# Patient Record
Sex: MALE | Race: WHITE | NOT HISPANIC OR LATINO | Employment: FULL TIME
[De-identification: names, ages, dates, MRNs, and addresses within clinical notes are randomized per-mention and may not be internally consistent; named-entity substitution may affect disease eponyms.]

---

## 2023-04-07 ENCOUNTER — TRANSCRIBE ORDERS (OUTPATIENT)
Dept: OTHER | Age: 53
End: 2023-04-07

## 2023-04-07 DIAGNOSIS — M24.311: Primary | ICD-10-CM

## 2023-04-07 DIAGNOSIS — S46.011A STRAIN OF RIGHT ROTATOR CUFF CAPSULE, INITIAL ENCOUNTER: ICD-10-CM

## 2023-04-07 DIAGNOSIS — S42.291A HILL-SACHS LESION OF RIGHT SHOULDER: ICD-10-CM

## 2023-04-18 ENCOUNTER — THERAPY VISIT (OUTPATIENT)
Dept: PHYSICAL THERAPY | Facility: CLINIC | Age: 53
End: 2023-04-18
Attending: ORTHOPAEDIC SURGERY
Payer: OTHER MISCELLANEOUS

## 2023-04-18 DIAGNOSIS — M25.311 SHOULDER INSTABILITY, RIGHT: Primary | ICD-10-CM

## 2023-04-18 PROCEDURE — 97110 THERAPEUTIC EXERCISES: CPT | Mod: GP | Performed by: PHYSICAL THERAPIST

## 2023-04-18 PROCEDURE — 97161 PT EVAL LOW COMPLEX 20 MIN: CPT | Mod: GP | Performed by: PHYSICAL THERAPIST

## 2023-04-21 ENCOUNTER — THERAPY VISIT (OUTPATIENT)
Dept: PHYSICAL THERAPY | Facility: CLINIC | Age: 53
End: 2023-04-21
Payer: OTHER MISCELLANEOUS

## 2023-04-21 DIAGNOSIS — M25.311 SHOULDER INSTABILITY, RIGHT: Primary | ICD-10-CM

## 2023-04-21 PROCEDURE — 97110 THERAPEUTIC EXERCISES: CPT | Mod: GP | Performed by: PHYSICAL THERAPIST

## 2023-04-28 ENCOUNTER — THERAPY VISIT (OUTPATIENT)
Dept: PHYSICAL THERAPY | Facility: CLINIC | Age: 53
End: 2023-04-28
Payer: OTHER MISCELLANEOUS

## 2023-04-28 DIAGNOSIS — M25.311 SHOULDER INSTABILITY, RIGHT: Primary | ICD-10-CM

## 2023-04-28 PROCEDURE — 97110 THERAPEUTIC EXERCISES: CPT | Mod: GP | Performed by: PHYSICAL THERAPIST

## 2023-05-02 ENCOUNTER — THERAPY VISIT (OUTPATIENT)
Dept: PHYSICAL THERAPY | Facility: CLINIC | Age: 53
End: 2023-05-02
Payer: OTHER MISCELLANEOUS

## 2023-05-02 DIAGNOSIS — M25.511 CHRONIC RIGHT SHOULDER PAIN: Primary | ICD-10-CM

## 2023-05-02 DIAGNOSIS — G89.29 CHRONIC RIGHT SHOULDER PAIN: Primary | ICD-10-CM

## 2023-05-02 PROCEDURE — 97110 THERAPEUTIC EXERCISES: CPT | Mod: GP | Performed by: PHYSICAL THERAPIST

## 2023-05-04 ENCOUNTER — THERAPY VISIT (OUTPATIENT)
Dept: PHYSICAL THERAPY | Facility: CLINIC | Age: 53
End: 2023-05-04
Payer: OTHER MISCELLANEOUS

## 2023-05-04 DIAGNOSIS — G89.29 CHRONIC RIGHT SHOULDER PAIN: Primary | ICD-10-CM

## 2023-05-04 DIAGNOSIS — M25.511 CHRONIC RIGHT SHOULDER PAIN: Primary | ICD-10-CM

## 2023-05-04 PROCEDURE — 97110 THERAPEUTIC EXERCISES: CPT | Mod: GP | Performed by: PHYSICAL THERAPIST

## 2023-05-19 ENCOUNTER — THERAPY VISIT (OUTPATIENT)
Dept: PHYSICAL THERAPY | Facility: CLINIC | Age: 53
End: 2023-05-19
Payer: OTHER MISCELLANEOUS

## 2023-05-19 DIAGNOSIS — M25.511 CHRONIC RIGHT SHOULDER PAIN: Primary | ICD-10-CM

## 2023-05-19 DIAGNOSIS — G89.29 CHRONIC RIGHT SHOULDER PAIN: Primary | ICD-10-CM

## 2023-05-19 PROCEDURE — 97110 THERAPEUTIC EXERCISES: CPT | Mod: GP | Performed by: PHYSICAL THERAPIST

## 2023-05-24 ENCOUNTER — THERAPY VISIT (OUTPATIENT)
Dept: PHYSICAL THERAPY | Facility: CLINIC | Age: 53
End: 2023-05-24
Attending: ORTHOPAEDIC SURGERY
Payer: OTHER MISCELLANEOUS

## 2023-05-24 DIAGNOSIS — M25.511 CHRONIC RIGHT SHOULDER PAIN: Primary | ICD-10-CM

## 2023-05-24 DIAGNOSIS — G89.29 CHRONIC RIGHT SHOULDER PAIN: Primary | ICD-10-CM

## 2023-05-24 PROCEDURE — 97110 THERAPEUTIC EXERCISES: CPT | Mod: GP | Performed by: PHYSICAL THERAPIST

## 2023-06-01 ENCOUNTER — THERAPY VISIT (OUTPATIENT)
Dept: PHYSICAL THERAPY | Facility: CLINIC | Age: 53
End: 2023-06-01
Payer: OTHER MISCELLANEOUS

## 2023-06-01 DIAGNOSIS — M25.511 CHRONIC RIGHT SHOULDER PAIN: Primary | ICD-10-CM

## 2023-06-01 DIAGNOSIS — G89.29 CHRONIC RIGHT SHOULDER PAIN: Primary | ICD-10-CM

## 2023-06-01 PROCEDURE — 97110 THERAPEUTIC EXERCISES: CPT | Mod: GP | Performed by: PHYSICAL THERAPIST

## 2023-06-06 ENCOUNTER — THERAPY VISIT (OUTPATIENT)
Dept: PHYSICAL THERAPY | Facility: CLINIC | Age: 53
End: 2023-06-06
Payer: OTHER MISCELLANEOUS

## 2023-06-06 DIAGNOSIS — G89.29 CHRONIC RIGHT SHOULDER PAIN: Primary | ICD-10-CM

## 2023-06-06 DIAGNOSIS — M25.511 CHRONIC RIGHT SHOULDER PAIN: Primary | ICD-10-CM

## 2023-06-06 PROCEDURE — 97112 NEUROMUSCULAR REEDUCATION: CPT | Mod: GP | Performed by: PHYSICAL THERAPIST

## 2023-06-06 PROCEDURE — 97110 THERAPEUTIC EXERCISES: CPT | Mod: GP | Performed by: PHYSICAL THERAPIST

## 2023-06-22 ENCOUNTER — THERAPY VISIT (OUTPATIENT)
Dept: PHYSICAL THERAPY | Facility: CLINIC | Age: 53
End: 2023-06-22
Payer: OTHER MISCELLANEOUS

## 2023-06-22 DIAGNOSIS — M25.511 CHRONIC RIGHT SHOULDER PAIN: Primary | ICD-10-CM

## 2023-06-22 DIAGNOSIS — G89.29 CHRONIC RIGHT SHOULDER PAIN: Primary | ICD-10-CM

## 2023-06-22 DIAGNOSIS — M25.311 SHOULDER INSTABILITY, RIGHT: ICD-10-CM

## 2023-06-22 PROCEDURE — 97110 THERAPEUTIC EXERCISES: CPT | Mod: GP | Performed by: PHYSICAL THERAPIST

## 2023-06-23 NOTE — PROGRESS NOTES
PLAN  Continue therapy per current plan of care.     06/22/23 0500   Appointment Info   Signing clinician's name / credentials Tank Carl PT ATC Cert   Total/Authorized Visits 12   Visits Used 8   Medical Diagnosis R Bankhart Injury   PT Tx Diagnosis R shoudle rpain and instability   Precautions/Limitations End range positions of ER and extension beyond midline   Other pertinent information non surgical   Progress Note/Certification   Onset of illness/injury or Date of Surgery 03/03/23   Therapy Frequency 1x/week   Predicted Duration 12 weeks   Progress Note Due Date 07/06/23       Present No   PT Goal 1   Goal Identifier reaching   Goal Description dressing, showering, personal cares , work   Rationale to maximize safety and independence with performance of ADLs and functional tasks;to maximize safety and independence within the home;to maximize safety and independence with self cares   Goal Progress improving ROM to roughly 90% restored overhead, limited ER and posterior reach   Target Date 07/18/23   Subjective Report   Subjective Report Mahad returns to therapy without sling today.  He has been seen in therapy for a total of 8 visits. He has RTW with restrictions of heavy lifting and repeated movement overhead. Working at [employer] [facility] with lifting restrictions. He is intermittant soreness with R shoulder , but no sx's of subluxation or dislocation. Slowly on the move towards restoreing function. SPADI improved to 15/100. He notes of 80-90% recovered function of day to day life. Allison has 3 additional visits remaining with goal of full self care and DC by the end of July 2023. Concentration to scap stability, proprioception and endurance RTC strengthening.   Objective Measures   Objective Measures Objective Measure 1;Objective Measure 2;Objective Measure 3;Objective Measure 4   Objective Measure 1   Objective Measure Assisted flexion ROM   Details 160-170  degrees near equal to the opposite side. AROM 150   Objective Measure 2   Objective Measure strength.   Details weak and painful with ER and abduction   Objective Measure 3   Objective Measure painful arc and positive Neers/Cox Test   Objective Measure 4   Objective Measure scap dyskinesia noted   Details Flexion and abduction position   Cryotherapy   Ice -Type Pack   Duration 10 min   Location R shoudler   Positioning sitting   Treatment Interventions (PT)   Interventions Therapeutic Procedure/Exercise;Neuromuscular Re-education   Therapeutic Procedure/Exercise   Therapeutic Procedures: strength, endurance, ROM, flexibillity minutes (21436) 40   Therapeutic Procedures Ther Proc 2;Ther Proc 3;Ther Proc 4;Ther Proc 5;Ther Proc 6;Ther Proc 7;Ther Proc 8   Ther Proc 1 UBE   Ther Proc 1 - Details x 3 , 30:30, 30-50 rpm   Ther Proc 2 ball vs wall   Ther Proc 2 - Details x 30 sec each direction   Ther Proc 3 adduction with cable   Ther Proc 3 - Details 3 plates 2 x 15   Ther Proc 4 SL ER   Ther Proc 4 - Details 2 x 15 , towel under arm 1#   Ther Proc 5 standing arm extension , palm forward   Ther Proc 5 - Details 2 x 15 1# bilaterally   Skilled Intervention manual quing, visual quing and tactile strategies to MM fatigue, strength and re-education   Patient Response/Progress fatigue, painfree   Ther Proc 6 scap retrac   Ther Proc 6 - Details 3 plates x 30   Ther Proc 7 manual pertebation in supine   Ther Proc 7 - Details 3 x 20 sec   Education   Learner/Method Patient;Demonstration;Pictures/Video;No Barriers to Learning   Plan   Home program see PTrx   Updates to plan of care advanced stabilization exercise s   Plan for next session weight bearing strengthening such as wall push ups and proprioception.   Comments   Comments weight restriction to 5# floor to waist , 1# to shoulder, nothing above shoulder   Total Session Time   Timed Code Treatment Minutes 40   Total Treatment Time (sum of timed and untimed services) 40            Referring Provider:  Osman Stovall

## 2023-07-05 ENCOUNTER — THERAPY VISIT (OUTPATIENT)
Dept: PHYSICAL THERAPY | Facility: CLINIC | Age: 53
End: 2023-07-05
Payer: OTHER MISCELLANEOUS

## 2023-07-05 DIAGNOSIS — M25.511 CHRONIC RIGHT SHOULDER PAIN: Primary | ICD-10-CM

## 2023-07-05 DIAGNOSIS — G89.29 CHRONIC RIGHT SHOULDER PAIN: Primary | ICD-10-CM

## 2023-07-05 PROCEDURE — 97110 THERAPEUTIC EXERCISES: CPT | Mod: GP | Performed by: PHYSICAL THERAPIST

## 2023-07-10 ENCOUNTER — THERAPY VISIT (OUTPATIENT)
Dept: PHYSICAL THERAPY | Facility: CLINIC | Age: 53
End: 2023-07-10
Payer: OTHER MISCELLANEOUS

## 2023-07-10 DIAGNOSIS — M25.511 CHRONIC RIGHT SHOULDER PAIN: Primary | ICD-10-CM

## 2023-07-10 DIAGNOSIS — G89.29 CHRONIC RIGHT SHOULDER PAIN: Primary | ICD-10-CM

## 2023-07-10 PROCEDURE — 97110 THERAPEUTIC EXERCISES: CPT | Mod: GP | Performed by: PHYSICAL THERAPIST

## 2023-08-03 ENCOUNTER — THERAPY VISIT (OUTPATIENT)
Dept: PHYSICAL THERAPY | Facility: CLINIC | Age: 53
End: 2023-08-03
Payer: OTHER MISCELLANEOUS

## 2023-08-03 DIAGNOSIS — G89.29 CHRONIC RIGHT SHOULDER PAIN: Primary | ICD-10-CM

## 2023-08-03 DIAGNOSIS — M25.511 CHRONIC RIGHT SHOULDER PAIN: Primary | ICD-10-CM

## 2023-08-03 PROCEDURE — 97110 THERAPEUTIC EXERCISES: CPT | Mod: GP | Performed by: PHYSICAL THERAPIST

## 2023-08-08 ENCOUNTER — THERAPY VISIT (OUTPATIENT)
Dept: PHYSICAL THERAPY | Facility: CLINIC | Age: 53
End: 2023-08-08
Payer: OTHER MISCELLANEOUS

## 2023-08-08 DIAGNOSIS — G89.29 CHRONIC RIGHT SHOULDER PAIN: Primary | ICD-10-CM

## 2023-08-08 DIAGNOSIS — M25.511 CHRONIC RIGHT SHOULDER PAIN: Primary | ICD-10-CM

## 2023-08-08 PROCEDURE — 97110 THERAPEUTIC EXERCISES: CPT | Mod: GP | Performed by: PHYSICAL THERAPIST

## 2023-08-16 ENCOUNTER — THERAPY VISIT (OUTPATIENT)
Dept: PHYSICAL THERAPY | Facility: CLINIC | Age: 53
End: 2023-08-16
Payer: OTHER MISCELLANEOUS

## 2023-08-16 DIAGNOSIS — G89.29 CHRONIC RIGHT SHOULDER PAIN: Primary | ICD-10-CM

## 2023-08-16 DIAGNOSIS — M25.511 CHRONIC RIGHT SHOULDER PAIN: Primary | ICD-10-CM

## 2023-08-16 PROCEDURE — 97110 THERAPEUTIC EXERCISES: CPT | Mod: GP | Performed by: PHYSICAL THERAPIST

## 2023-08-30 ENCOUNTER — THERAPY VISIT (OUTPATIENT)
Dept: PHYSICAL THERAPY | Facility: CLINIC | Age: 53
End: 2023-08-30
Payer: OTHER MISCELLANEOUS

## 2023-08-30 DIAGNOSIS — G89.29 CHRONIC RIGHT SHOULDER PAIN: Primary | ICD-10-CM

## 2023-08-30 DIAGNOSIS — M25.511 CHRONIC RIGHT SHOULDER PAIN: Primary | ICD-10-CM

## 2023-08-30 PROCEDURE — 97110 THERAPEUTIC EXERCISES: CPT | Mod: GP | Performed by: PHYSICAL THERAPIST

## 2023-09-26 ENCOUNTER — TRANSCRIBE ORDERS (OUTPATIENT)
Dept: OTHER | Age: 53
End: 2023-09-26

## 2023-09-26 DIAGNOSIS — M24.311: ICD-10-CM

## 2023-09-26 DIAGNOSIS — M75.101 TEAR OF RIGHT SUPRASPINATUS TENDON: ICD-10-CM

## 2023-09-26 DIAGNOSIS — S42.291A HILL-SACHS LESION OF RIGHT SHOULDER: ICD-10-CM

## 2023-10-19 ENCOUNTER — THERAPY VISIT (OUTPATIENT)
Dept: PHYSICAL THERAPY | Facility: CLINIC | Age: 53
End: 2023-10-19
Payer: OTHER MISCELLANEOUS

## 2023-10-19 DIAGNOSIS — M25.511 CHRONIC RIGHT SHOULDER PAIN: Primary | ICD-10-CM

## 2023-10-19 DIAGNOSIS — G89.29 CHRONIC RIGHT SHOULDER PAIN: Primary | ICD-10-CM

## 2023-10-19 PROCEDURE — 97110 THERAPEUTIC EXERCISES: CPT | Mod: GP | Performed by: PHYSICAL THERAPIST

## 2023-10-19 NOTE — PROGRESS NOTES
PLAN  Continue therapy per current plan of care.     10/19/23 0500   Appointment Info   Signing clinician's name / credentials Tank Carl PT ATC Cert   Total/Authorized Visits 16   Visits Used 14   Medical Diagnosis R Bankhart Injury   PT Tx Diagnosis R shoudle rpain and instability   Precautions/Limitations End range positions of ER and extension beyond midline   Other pertinent information non surgical   Progress Note/Certification   Onset of illness/injury or Date of Surgery 03/03/23   Therapy Frequency 1x/week   Predicted Duration 12 weeks   Progress Note Due Date 10/19/23   Progress Note Completed Date 11/18/23       Present No   GOALS   PT Goals 2   PT Goal 1   Goal Identifier reaching   Goal Description dressing, showering, personal cares , work   Rationale to maximize safety and independence with performance of ADLs and functional tasks;to maximize safety and independence within the home;to maximize safety and independence with self cares   Goal Progress improving ROM to roughly 90% restored overhead, limited ER and posterior reach   Target Date 07/18/23   Date Met 08/08/23   PT Goal 2   Goal Identifier lifting   Goal Description restricted lifting. Goal is to improve lifting floor to waist 25#, waist to shoudler 15#, 2-3# overhead.   Rationale to maximize safety and independence with self cares;to maximize safety and independence within the community;to maximize safety and independence within the home;to maximize safety and independence with performance of ADLs and functional tasks   Target Date 12/22/23   Subjective Report   Subjective Report Patient retiurns from MD office reporting roughly 80% recovery since DOI. MD referred him sback for continued insruction of improving strength and managing rehab and global fitness at his newly subscribed membership to [employer].   Objective Measures   Objective Measures Objective Measure 1;Objective Measure 2;Objective Measure  3;Objective Measure 4;Objective Measure 5   Objective Measure 1   Objective Measure AROM flexion   Details 160 bilaterally , ER 75 each side, IR 90 , posterior reach T12 each side.   Objective Measure 2   Objective Measure strength.   Details weak and painless ER, abduction is uncomfortable but  improving   Objective Measure 3   Objective Measure positive Neers/Cox Test   Objective Measure 4   Objective Measure scap control is improved without hike while elevating his shoudler as he had initially.   Details abduction position.   Objective Measure 5   Objective Measure apprehension sign   Details positive   Treatment Interventions (PT)   Interventions Therapeutic Procedure/Exercise;Neuromuscular Re-education   Therapeutic Procedure/Exercise   Therapeutic Procedures: strength, endurance, ROM, flexibillity minutes (11334) 40   Therapeutic Procedures Ther Proc 2;Ther Proc 3;Ther Proc 4;Ther Proc 5;Ther Proc 6;Ther Proc 7;Ther Proc 8   Ther Proc 1 UBE   Ther Proc 1 - Details x 3 , 30:30, 60 rpm   Ther Proc 2 seted ROW   Ther Proc 2 - Details Lat pull down   Ther Proc 3 wall sit   Ther Proc 3 - Details leg press   Ther Proc 4 calf raises   Ther Proc 4 - Details bridging   Ther Proc 5 dead bug   Ther Proc 5 - Details tricep   Ther Proc 6 bicep   Skilled Intervention manual quing, visual quing and tactile strategies to MM fatigue, strength and re-education   Patient Response/Progress fatigue, painfree   Education   Learner/Method Patient;Demonstration;Pictures/Video;No Barriers to Learning   Plan   Home program see PTrx   Updates to plan of care advanced stabilization exercise s   Plan for next session follow up 1 month. SPADI, modify global strength efforts   Comments   Comments strength test lifting next visit   Total Session Time   Timed Code Treatment Minutes 40   Total Treatment Time (sum of timed and untimed services) 40         Referring Provider:  Osman Stovall

## 2023-11-29 ENCOUNTER — THERAPY VISIT (OUTPATIENT)
Dept: PHYSICAL THERAPY | Facility: CLINIC | Age: 53
End: 2023-11-29
Payer: OTHER MISCELLANEOUS

## 2023-11-29 DIAGNOSIS — G89.29 CHRONIC RIGHT SHOULDER PAIN: Primary | ICD-10-CM

## 2023-11-29 DIAGNOSIS — M25.511 CHRONIC RIGHT SHOULDER PAIN: Primary | ICD-10-CM

## 2023-11-29 PROCEDURE — 97110 THERAPEUTIC EXERCISES: CPT | Mod: GP | Performed by: PHYSICAL THERAPIST

## 2023-11-29 NOTE — PROGRESS NOTES
PLAN  Continue therapy per current plan of care.     11/29/23 0500   Appointment Info   Signing clinician's name / credentials Tank Carl PT ATC Cert   Total/Authorized Visits 16   Medical Diagnosis R Bankhart Injury   PT Tx Diagnosis R shoudle rpain and instability   Precautions/Limitations End range positions of ER and extension beyond midline   Other pertinent information non surgical   Progress Note/Certification   Onset of illness/injury or Date of Surgery 03/03/23   Therapy Frequency 1x/week   Predicted Duration 12 weeks   Progress Note Due Date 10/19/23   Progress Note Completed Date 11/18/23       Present No   GOALS   PT Goals 2   PT Goal 1   Goal Identifier reaching   Goal Description dressing, showering, personal cares , work   Rationale to maximize safety and independence with performance of ADLs and functional tasks;to maximize safety and independence within the home;to maximize safety and independence with self cares   Goal Progress improving ROM to roughly 90% restored overhead, limited ER and posterior reach   Target Date 07/18/23   Date Met 08/08/23   PT Goal 2   Goal Identifier lifting   Goal Description restricted lifting. Goal is to improve lifting floor to waist 25#, waist to shoudler 15#, 2-3# overhead.   Rationale to maximize safety and independence with self cares;to maximize safety and independence within the community;to maximize safety and independence within the home;to maximize safety and independence with performance of ADLs and functional tasks   Target Date 12/22/23   Subjective Report   Subjective Report Patient returns to therapy noting that his R shoulder is really painful again and has not retuirned to the MD or weight room for considitioning He cannot sleep on the R and reaching, lifting reproduces pain. No pain at rest. palpation pain at proximal biceps tendon.. crepitous and hurts with movement such as roling his shoudler forward and backward. He avoids  heavy lifting and reach.   Objective Measures   Objective Measures Objective Measure 1;Objective Measure 2;Objective Measure 3;Objective Measure 4;Objective Measure 5;Objective Measure 6   Objective Measure 1   Objective Measure AROM flexion   Details 160 bilaterally , ER 75 each side, IR 90 , posterior reach T12 each side.   Objective Measure 2   Objective Measure strength.   Details weak and painful ER and abduction   Objective Measure 3   Objective Measure positive Neers/Cox Test   Objective Measure 4   Objective Measure scap control is improved without hike while elevating his shoudler as he had initially.   Details abduction position.   Objective Measure 5   Objective Measure apprehension sign   Details negative   Objective Measure 6   Objective Measure Scap dyskinesia   Details lower trap and serratus anterior weakness   Treatment Interventions (PT)   Interventions Therapeutic Procedure/Exercise;Neuromuscular Re-education   Therapeutic Procedure/Exercise   Therapeutic Procedures: strength, endurance, ROM, flexibillity minutes (89000) 38   Therapeutic Procedures Ther Proc 2;Ther Proc 3;Ther Proc 4;Ther Proc 5;Ther Proc 6;Ther Proc 7;Ther Proc 8   Ther Proc 1 supine ceiling punch   Ther Proc 1 - Details x 30-40 reps 5 ounces or less   Ther Proc 2 SUpien Reverse pendulum   Ther Proc 2 - Details 0-5 ounces 30-40 reps all direction , supine   Ther Proc 3 scap retraction   Ther Proc 3 - Details standing 5 sec hold x 20-30   Ther Proc 8 re-evaluation   Ther Proc 9 scap stability and assist test   Skilled Intervention manual quing, visual quing and tactile strategies to MM fatigue, strength and re-education   Patient Response/Progress fatigue, painfree   Ther Proc 9 - Details no effect to painful arc.   Education   Learner/Method Patient;Demonstration;Pictures/Video;No Barriers to Learning   Plan   Home program see PTrx   Updates to plan of care advanced stabilization exercise s   Plan for next session follow up 1  month. SPADI, modify global strength efforts   Comments   Comments strength test lifting next visit, recommend back to therapy for scap stability and global RTC strength   Total Session Time   Timed Code Treatment Minutes 38   Total Treatment Time (sum of timed and untimed services) 38           Referring Provider:  Osman Stovall

## 2023-12-01 ENCOUNTER — TRANSCRIBE ORDERS (OUTPATIENT)
Dept: OTHER | Age: 53
End: 2023-12-01

## 2023-12-01 DIAGNOSIS — M75.101 TEAR OF RIGHT SUPRASPINATUS TENDON: Primary | ICD-10-CM

## 2023-12-01 DIAGNOSIS — S42.291A HILL-SACHS LESION OF RIGHT SHOULDER: ICD-10-CM

## 2023-12-01 DIAGNOSIS — M24.311: ICD-10-CM

## 2024-02-05 ENCOUNTER — THERAPY VISIT (OUTPATIENT)
Dept: PHYSICAL THERAPY | Facility: CLINIC | Age: 54
End: 2024-02-05
Payer: OTHER MISCELLANEOUS

## 2024-02-05 DIAGNOSIS — G89.29 CHRONIC RIGHT SHOULDER PAIN: Primary | ICD-10-CM

## 2024-02-05 DIAGNOSIS — M25.511 CHRONIC RIGHT SHOULDER PAIN: Primary | ICD-10-CM

## 2024-02-05 PROCEDURE — 97110 THERAPEUTIC EXERCISES: CPT | Mod: GP | Performed by: PHYSICAL THERAPIST

## 2024-02-11 NOTE — PROGRESS NOTES
PLAN  Continue therapy per current plan of care.     02/05/24 0500   Appointment Info   Signing clinician's name / credentials Tank Carl PT ATC Cert   Total/Authorized Visits 16   Medical Diagnosis R Bankhart Injury   PT Tx Diagnosis R shoudle rpain and instability   Precautions/Limitations End range positions of ER and extension beyond midline   Other pertinent information non surgical   Progress Note/Certification   Onset of illness/injury or Date of Surgery 03/03/23   Therapy Frequency 1x/week   Predicted Duration 12 weeks   Progress Note Due Date 10/19/23   Progress Note Completed Date 11/18/23       Present No   GOALS   PT Goals 2   PT Goal 1   Goal Identifier reaching   Goal Description dressing, showering, personal cares , work   Rationale to maximize safety and independence with performance of ADLs and functional tasks;to maximize safety and independence within the home;to maximize safety and independence with self cares   Goal Progress improving ROM to roughly 90% restored overhead, limited ER and posterior reach   Target Date 07/18/23   Date Met 08/08/23   PT Goal 2   Goal Identifier lifting   Goal Description restricted lifting. Goal is to improve lifting floor to waist 25#, waist to shoudler 15#, 2-3# overhead.   Rationale to maximize safety and independence with self cares;to maximize safety and independence within the community;to maximize safety and independence within the home;to maximize safety and independence with performance of ADLs and functional tasks   Target Date 12/22/23   Subjective Report   Subjective Report Patient returns to therapy noting that his R shoulder is really painful again and has not retuirned to the MD or weight room for considitioning He cannot sleep on the R and reaching, lifting reproduces pain. No pain at rest. palpation pain at proximal biceps tendon.. crepitous and hurts with movement such as roling his shoudler forward and backward. He avoids  heavy lifting and reach.   Objective Measures   Objective Measures Objective Measure 1;Objective Measure 2;Objective Measure 3;Objective Measure 4;Objective Measure 5;Objective Measure 6   Objective Measure 1   Objective Measure AROM flexion   Details 160 bilaterally , ER 75 each side, IR 90 , posterior reach T12 each side.   Objective Measure 2   Objective Measure strength.   Details weak and painful ER and abduction   Objective Measure 3   Objective Measure positive Neers/Cox Test   Objective Measure 4   Objective Measure scap control is improved without hike while elevating his shoudler as he had initially.   Details abduction position.   Objective Measure 5   Objective Measure apprehension sign   Details negative   Objective Measure 6   Objective Measure Scap dyskinesia   Details lower trap and serratus anterior weakness   Treatment Interventions (PT)   Interventions Therapeutic Procedure/Exercise;Neuromuscular Re-education   Therapeutic Procedure/Exercise   Therapeutic Procedures: strength, endurance, ROM, flexibillity minutes (87128) 37   Therapeutic Procedures Ther Proc 2;Ther Proc 3;Ther Proc 4;Ther Proc 5;Ther Proc 6;Ther Proc 7;Ther Proc 8   Ther Proc 1 supine ceiling punch   Ther Proc 1 - Details x 30-40 reps 5 ounces or less   Ther Proc 2 SUpien Reverse pendulum   Ther Proc 2 - Details 0-5 ounces 30-40 reps all direction , supine   Ther Proc 3 scap retraction   Ther Proc 3 - Details standing 5 sec hold x 20-30   Ther Proc 8 re-evaluation   Ther Proc 9 scap stability and assist test   Ther Proc 9 - Details no effect to painful arc.   Skilled Intervention manual quing, visual quing and tactile strategies to MM fatigue, strength and re-education   Patient Response/Progress fatigue, painfree   Education   Learner/Method Patient;Demonstration;Pictures/Video;No Barriers to Learning   Plan   Home program see PTrx   Updates to plan of care advanced stabilization exercise s   Plan for next session follow up 1  month. SPADI, modify global strength efforts   Comments   Comments strength test lifting next visit, recommend back to therapy for scap stability and global RTC strength   Total Session Time   Timed Code Treatment Minutes 37   Total Treatment Time (sum of timed and untimed services) 37             Referring Provider:  Osman Stovall

## 2024-02-27 ENCOUNTER — THERAPY VISIT (OUTPATIENT)
Dept: PHYSICAL THERAPY | Facility: CLINIC | Age: 54
End: 2024-02-27
Payer: OTHER MISCELLANEOUS

## 2024-02-27 DIAGNOSIS — M25.511 CHRONIC RIGHT SHOULDER PAIN: Primary | ICD-10-CM

## 2024-02-27 DIAGNOSIS — G89.29 CHRONIC RIGHT SHOULDER PAIN: Primary | ICD-10-CM

## 2024-02-27 PROCEDURE — 97110 THERAPEUTIC EXERCISES: CPT | Mod: GP | Performed by: PHYSICAL THERAPIST

## 2024-02-27 PROCEDURE — 97112 NEUROMUSCULAR REEDUCATION: CPT | Mod: GP | Performed by: PHYSICAL THERAPIST

## 2024-03-12 ENCOUNTER — THERAPY VISIT (OUTPATIENT)
Dept: PHYSICAL THERAPY | Facility: CLINIC | Age: 54
End: 2024-03-12
Payer: OTHER MISCELLANEOUS

## 2024-03-12 DIAGNOSIS — M25.311 SHOULDER INSTABILITY, RIGHT: ICD-10-CM

## 2024-03-12 DIAGNOSIS — G89.29 CHRONIC RIGHT SHOULDER PAIN: Primary | ICD-10-CM

## 2024-03-12 DIAGNOSIS — M25.511 CHRONIC RIGHT SHOULDER PAIN: Primary | ICD-10-CM

## 2024-03-12 PROCEDURE — 97112 NEUROMUSCULAR REEDUCATION: CPT | Mod: GP | Performed by: PHYSICAL THERAPIST

## 2024-03-12 PROCEDURE — 97110 THERAPEUTIC EXERCISES: CPT | Mod: GP | Performed by: PHYSICAL THERAPIST

## 2024-03-28 ENCOUNTER — TRANSCRIBE ORDERS (OUTPATIENT)
Dept: OTHER | Age: 54
End: 2024-03-28

## 2024-03-28 DIAGNOSIS — S43.004A DISLOCATION OF RIGHT SHOULDER JOINT, INITIAL ENCOUNTER: Primary | ICD-10-CM

## 2024-03-28 DIAGNOSIS — M75.101 TEAR OF RIGHT ROTATOR CUFF, UNSPECIFIED TEAR EXTENT, UNSPECIFIED WHETHER TRAUMATIC: ICD-10-CM

## 2024-03-28 DIAGNOSIS — S42.291A HILL-SACHS LESION OF RIGHT SHOULDER: ICD-10-CM

## 2024-06-11 ENCOUNTER — TRANSCRIBE ORDERS (OUTPATIENT)
Dept: OTHER | Age: 54
End: 2024-06-11

## 2024-06-11 DIAGNOSIS — Z98.890 S/P RIGHT ROTATOR CUFF REPAIR: ICD-10-CM

## 2024-06-11 DIAGNOSIS — Z47.89 ORTHOPEDIC AFTERCARE: Primary | ICD-10-CM

## 2024-08-29 ENCOUNTER — THERAPY VISIT (OUTPATIENT)
Dept: PHYSICAL THERAPY | Facility: CLINIC | Age: 54
End: 2024-08-29
Payer: OTHER MISCELLANEOUS

## 2024-08-29 DIAGNOSIS — Z98.890 S/P SHOULDER SURGERY: Primary | ICD-10-CM

## 2024-08-29 DIAGNOSIS — M25.511 ACUTE PAIN OF RIGHT SHOULDER: ICD-10-CM

## 2024-08-29 PROCEDURE — 97110 THERAPEUTIC EXERCISES: CPT | Mod: GP | Performed by: PHYSICAL THERAPIST

## 2024-08-29 PROCEDURE — 97161 PT EVAL LOW COMPLEX 20 MIN: CPT | Mod: GP | Performed by: PHYSICAL THERAPIST

## 2024-08-29 ASSESSMENT — ACTIVITIES OF DAILY LIVING (ADL)
WASHING_YOUR_BACK: 3
PLEASE_INDICATE_YOR_PRIMARY_REASON_FOR_REFERRAL_TO_THERAPY:: SHOULDER
PUTTING_ON_AN_UNDERSHIRT_OR_A_PULLOVER_SWEATER: 0
WHEN_LYING_ON_THE_INVOLVED_SIDE: 3
REMOVING_SOMETHING_FROM_YOUR_BACK_POCKET: 0
PUTTING_ON_A_SHIRT_THAT_BUTTONS_DOWN_THE_FRONT: 0
TOUCHING_THE_BACK_OF_YOUR_NECK: 0
AT_ITS_WORST?: 0
WASHING_YOUR_HAIR?: 0
PUTTING_ON_YOUR_PANTS: 0

## 2024-08-29 NOTE — PROGRESS NOTES
PHYSICAL THERAPY EVALUATION  Type of Visit: Evaluation   Mahad is a pleasant 53 yo male referred to physical therapy under the direction of Dr. DARLENE Stovall following Massive RCR surgery on 5/30/2024. Patient has discontinued wearing post op sling for 10 weeks. He is now 13 weeks post op. He is not working FT at [employer] as he had been prior to surgery. He was injured at a prior work place, resulting in WC claim that is still ongoing. He does have legal assist as well.       Fall Risk Screen:  Fall screen completed by: PT  Have you fallen 2 or more times in the past year?: No  Have you fallen and had an injury in the past year?: No  Is patient a fall risk?: No    Subjective       Presenting condition or subjective complaint: Right rotator cuff  Date of onset: 05/30/24    Relevant medical history: High blood pressure   Dates & types of surgery: Rotator cuff May 30, 2024    Prior diagnostic imaging/testing results: MRI     Prior therapy history for the same diagnosis, illness or injury:  yes , March - October 2023      Prior Level of Function  Transfers: Independent  Ambulation: Independent  ADL: Independent      Living Environment  Social support: With a significant other or spouse   Type of home: House   Stairs to enter the home:         Ramp: No   Stairs inside the home: Yes 12 Is there a railing: Yes     Help at home: Home and Yard maintenance tasks  Equipment owned:       Employment: Yes Part time work  Hobbies/Interests: Motorcycling    Patient goals for therapy: Be healthy    Pain assessment:  minimal pain with day to day life and sleeping through the night      Objective   SHOULDER EVALUATION  PAIN: Pain is Exacerbated By: reaching, use of arm overhead and behind the back   Pain is Relieved By: cold and rest  INTEGUMENTARY (edema, incisions):  post op portholes are clean and healing well.   POSTURE:  fair, forward head and rounded shoulders   GAIT:   Weightbearing Status: WBAT    ROM:   (Degrees) Left AROM  Left PROM Right AROM  Right PROM   Shoulder Flexion 170 175 150 150   Shoulder Extension       Shoulder Abduction       Shoulder Adduction       Shoulder Internal Rotation  90  40   Shoulder External Rotation  75  0   Shoulder Horizontal Abduction       Shoulder Horizontal Adduction       Shoulder Flexion ER       Shoulder Flexion IR       Elbow Extension  0  0   Elbow Flexion  135  135     CERVICAL SCREEN:  unremarkable     Assessment & Plan   CLINICAL IMPRESSIONS  Medical Diagnosis: s/p Massive RCR    Treatment Diagnosis: s/p Massive RCR   Impression/Assessment: Patient is a 54 year old male with R post op Massive RCR , Biceps Tenodesis, SAD, Coracoid decompression, ACJ Resection and extensive debridement  complaints.  The following significant findings have been identified: Pain, Decreased ROM/flexibility, and Decreased strength. These impairments interfere with their ability to perform self care tasks, work tasks, household chores, and driving  as compared to previous level of function.     Clinical Decision Making (Complexity):  Clinical Presentation: Stable/Uncomplicated  Clinical Presentation Rationale: based on medical and personal factors listed in PT evaluation  Clinical Decision Making (Complexity): Low complexity    PLAN OF CARE  Treatment Interventions:  Modalities: Cryotherapy  Interventions: Neuromuscular Re-education, Therapeutic Activity, Therapeutic Exercise, Self-Care/Home Management  [facility] Massive RCR Program     Long Term Goals     PT Goal 1  Goal Identifier: reaching  Goal Description: ability to use R arm overhead adn lifting up to full ROM  Rationale: to maximize safety and independence with performance of ADLs and functional tasks;to maximize safety and independence within the home;to maximize safety and independence with self cares  Target Date: 11/21/24      Frequency of Treatment: 1x/week  Duration of Treatment: 16 weeks      Education Assessment:   Learner/Method:  Patient;Demonstration;Pictures/Video;No Barriers to Learning    Risks and benefits of evaluation/treatment have been explained.   Patient/Family/caregiver agrees with Plan of Care.     Evaluation Time:     PT Eval, Low Complexity Minutes (73634): 15       Signing Clinician: Costa Carl PT

## 2024-09-05 ENCOUNTER — THERAPY VISIT (OUTPATIENT)
Dept: PHYSICAL THERAPY | Facility: CLINIC | Age: 54
End: 2024-09-05
Payer: OTHER MISCELLANEOUS

## 2024-09-05 DIAGNOSIS — Z98.890 S/P SHOULDER SURGERY: Primary | ICD-10-CM

## 2024-09-05 DIAGNOSIS — M25.511 ACUTE PAIN OF RIGHT SHOULDER: ICD-10-CM

## 2024-09-05 PROCEDURE — 97110 THERAPEUTIC EXERCISES: CPT | Mod: GP | Performed by: PHYSICAL THERAPIST

## 2024-09-19 ENCOUNTER — THERAPY VISIT (OUTPATIENT)
Dept: PHYSICAL THERAPY | Facility: CLINIC | Age: 54
End: 2024-09-19
Payer: OTHER MISCELLANEOUS

## 2024-09-19 DIAGNOSIS — Z98.890 S/P SHOULDER SURGERY: Primary | ICD-10-CM

## 2024-09-19 DIAGNOSIS — M25.511 ACUTE PAIN OF RIGHT SHOULDER: ICD-10-CM

## 2024-09-19 PROCEDURE — 97110 THERAPEUTIC EXERCISES: CPT | Mod: GP | Performed by: PHYSICAL THERAPIST

## 2024-09-26 ENCOUNTER — THERAPY VISIT (OUTPATIENT)
Dept: PHYSICAL THERAPY | Facility: CLINIC | Age: 54
End: 2024-09-26
Payer: OTHER MISCELLANEOUS

## 2024-09-26 DIAGNOSIS — Z98.890 S/P SHOULDER SURGERY: Primary | ICD-10-CM

## 2024-09-26 DIAGNOSIS — M25.511 ACUTE PAIN OF RIGHT SHOULDER: ICD-10-CM

## 2024-09-26 PROCEDURE — 97110 THERAPEUTIC EXERCISES: CPT | Mod: GP | Performed by: PHYSICAL THERAPIST

## 2024-09-26 NOTE — PROGRESS NOTES
PLAN  Continue therapy per current plan of care.     09/26/24 0500   Appointment Info   Signing clinician's name / credentials Tank Carl PT ATC Cert MDt   Total/Authorized Visits 8   Visits Used 4   Medical Diagnosis s/p Massive RCR   PT Tx Diagnosis s/p Massive RCR   Precautions/Limitations Protect SUbscap additional 6 weeks   Other pertinent information Follow [facility] post op Massive RCR Program   Progress Note/Certification   Onset of illness/injury or Date of Surgery 05/30/24   Therapy Frequency 1x/week   Predicted Duration 16 weeks   Progress Note Due Date 11/07/24       Present No   PT Goal 1   Goal Identifier reaching   Goal Description ability to use R arm overhead adn lifting up to full ROM   Rationale to maximize safety and independence with performance of ADLs and functional tasks;to maximize safety and independence within the home;to maximize safety and independence with self cares   Target Date 11/21/24   Subjective Report   Subjective Report Visit #4 . Patient has spent 6 [facility] in [facility] last night. Medication change and he is in pretty good shape for recovery today. R arm is really responding well. He is roughly 4 months post op Massive RCR , however , his rehab is delayed x 6 weeks. Therfore he is at 2 1/2 month valeria rehab intervention, progressing slow without pain. ROM is coming along nicely overhead reach, posterior reach. Limited ER yet. 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 Plan to stay the course with post op protocol care, no resisted work for additional 2 weeks yet.   Objective Measures   Objective Measures Objective Measure 1;Objective Measure 2;Objective Measure 3   Objective Measure 2   Objective Measure AROM   Details Flexion 160  vs 175, IR 90, ER 40,   Abduction 90   PT Modalities   PT Modalities Cryotherapy   Cryotherapy   Treatment Detail  seated CP to R shoulder   Patient Response/Progress reduces post exercise soreness   Treatment Interventions (PT)   Interventions Therapeutic Procedure/Exercise;Neuromuscular Re-education   Therapeutic Procedure/Exercise   Therapeutic Procedures: strength, endurance, ROM, flexibility minutes (09261) 38   Therapeutic Procedures Ther Proc 2;Ther Proc 3;Ther Proc 4;Ther Proc 5;Ther Proc 6   Ther Proc 1 pendulum x 20 each   Ther Proc 1 - Details Assisted wand flexion 5 sec hold x 10   Ther Proc 2 - Details seated ER to neutral 2 x 30   Ther Proc 3 isometric flexion x 10   Ther Proc 6 gentle Manual ROM check   Skilled Intervention manual and verbal directive   Patient Response/Progress minimal soreness   Ther Proc 3 - Details isometric abduction x 10   Ther Proc 4 scap retraction x 10   Ther Proc 4 - Details ceiling punch x 10-20   Ther Proc 5 Reverse pendulum x 10-20   Education   Learner/Method Patient;Demonstration;Pictures/Video;No Barriers to Learning   Plan   Home program see ptrx   Plan for next session AROM exercises, high reps every other day. ROM efforts daily. Follow up 2 weeks, SPADI next visit   Comments   Comments Follow [facility] post op Massive RCR Program   Total Session Time   Timed Code Treatment Minutes 38   Total Treatment Time (sum of timed and untimed services) 38           Referring Provider:  Osman Stovall

## 2024-10-10 ENCOUNTER — THERAPY VISIT (OUTPATIENT)
Dept: PHYSICAL THERAPY | Facility: CLINIC | Age: 54
End: 2024-10-10
Payer: OTHER MISCELLANEOUS

## 2024-10-10 DIAGNOSIS — G89.29 CHRONIC RIGHT SHOULDER PAIN: ICD-10-CM

## 2024-10-10 DIAGNOSIS — M25.511 CHRONIC RIGHT SHOULDER PAIN: ICD-10-CM

## 2024-10-10 DIAGNOSIS — M25.511 ACUTE PAIN OF RIGHT SHOULDER: ICD-10-CM

## 2024-10-10 DIAGNOSIS — Z98.890 S/P SHOULDER SURGERY: Primary | ICD-10-CM

## 2024-10-10 PROCEDURE — 97110 THERAPEUTIC EXERCISES: CPT | Mod: GP | Performed by: PHYSICAL THERAPIST

## 2024-10-17 ENCOUNTER — THERAPY VISIT (OUTPATIENT)
Dept: PHYSICAL THERAPY | Facility: CLINIC | Age: 54
End: 2024-10-17
Payer: OTHER MISCELLANEOUS

## 2024-10-17 DIAGNOSIS — Z98.890 S/P SHOULDER SURGERY: Primary | ICD-10-CM

## 2024-10-17 DIAGNOSIS — M25.511 ACUTE PAIN OF RIGHT SHOULDER: ICD-10-CM

## 2024-10-17 PROCEDURE — 97110 THERAPEUTIC EXERCISES: CPT | Mod: GP | Performed by: PHYSICAL THERAPIST

## 2024-10-24 ENCOUNTER — THERAPY VISIT (OUTPATIENT)
Dept: PHYSICAL THERAPY | Facility: CLINIC | Age: 54
End: 2024-10-24
Payer: OTHER MISCELLANEOUS

## 2024-10-24 DIAGNOSIS — Z98.890 S/P SHOULDER SURGERY: Primary | ICD-10-CM

## 2024-10-24 DIAGNOSIS — M25.511 ACUTE PAIN OF RIGHT SHOULDER: ICD-10-CM

## 2024-10-24 PROCEDURE — 97110 THERAPEUTIC EXERCISES: CPT | Mod: GP | Performed by: PHYSICAL THERAPIST

## 2024-12-10 ENCOUNTER — THERAPY VISIT (OUTPATIENT)
Dept: PHYSICAL THERAPY | Facility: CLINIC | Age: 54
End: 2024-12-10
Payer: OTHER MISCELLANEOUS

## 2024-12-10 DIAGNOSIS — Z98.890 S/P SHOULDER SURGERY: Primary | ICD-10-CM

## 2024-12-10 DIAGNOSIS — M25.511 ACUTE PAIN OF RIGHT SHOULDER: ICD-10-CM

## 2024-12-10 PROCEDURE — 97110 THERAPEUTIC EXERCISES: CPT | Mod: GP | Performed by: PHYSICAL THERAPIST

## 2024-12-17 ENCOUNTER — THERAPY VISIT (OUTPATIENT)
Dept: PHYSICAL THERAPY | Facility: CLINIC | Age: 54
End: 2024-12-17
Payer: OTHER MISCELLANEOUS

## 2024-12-17 DIAGNOSIS — Z98.890 S/P SHOULDER SURGERY: Primary | ICD-10-CM

## 2024-12-17 DIAGNOSIS — M25.511 ACUTE PAIN OF RIGHT SHOULDER: ICD-10-CM

## 2024-12-17 PROCEDURE — 97112 NEUROMUSCULAR REEDUCATION: CPT | Mod: GP | Performed by: PHYSICAL THERAPIST

## 2024-12-17 PROCEDURE — 97110 THERAPEUTIC EXERCISES: CPT | Mod: GP | Performed by: PHYSICAL THERAPIST

## 2024-12-26 ENCOUNTER — THERAPY VISIT (OUTPATIENT)
Dept: PHYSICAL THERAPY | Facility: CLINIC | Age: 54
End: 2024-12-26
Payer: OTHER MISCELLANEOUS

## 2024-12-26 DIAGNOSIS — Z98.890 S/P SHOULDER SURGERY: Primary | ICD-10-CM

## 2024-12-26 DIAGNOSIS — M25.511 ACUTE PAIN OF RIGHT SHOULDER: ICD-10-CM

## 2024-12-26 PROCEDURE — 97112 NEUROMUSCULAR REEDUCATION: CPT | Mod: GP | Performed by: PHYSICAL THERAPIST

## 2024-12-26 PROCEDURE — 97110 THERAPEUTIC EXERCISES: CPT | Mod: GP | Performed by: PHYSICAL THERAPIST

## 2024-12-31 ENCOUNTER — THERAPY VISIT (OUTPATIENT)
Dept: PHYSICAL THERAPY | Facility: CLINIC | Age: 54
End: 2024-12-31
Payer: OTHER MISCELLANEOUS

## 2024-12-31 DIAGNOSIS — M25.511 ACUTE PAIN OF RIGHT SHOULDER: ICD-10-CM

## 2024-12-31 DIAGNOSIS — M25.511 CHRONIC RIGHT SHOULDER PAIN: ICD-10-CM

## 2024-12-31 DIAGNOSIS — G89.29 CHRONIC RIGHT SHOULDER PAIN: ICD-10-CM

## 2024-12-31 DIAGNOSIS — Z98.890 S/P SHOULDER SURGERY: Primary | ICD-10-CM

## 2024-12-31 PROCEDURE — 97110 THERAPEUTIC EXERCISES: CPT | Mod: GP | Performed by: PHYSICAL THERAPIST

## 2024-12-31 NOTE — PROGRESS NOTES
"    PLAN  Continue therapy per current plan of care.     12/31/24 0500   Appointment Info   Signing clinician's name / credentials Tank Carl PT ATC Cert MDt   Total/Authorized Visits new referral x 8   Visits Used 3/8   Medical Diagnosis s/p Massive RCR   PT Tx Diagnosis s/p Massive RCR   Other pertinent information Follow [facility] post op Massive RCR Program   Progress Note/Certification   Onset of illness/injury or Date of Surgery 05/30/24   Therapy Frequency 1x/week   Predicted Duration 16 weeks   Progress Note Due Date 11/07/24   Progress Note Completed Date 12/17/24       Present No   PT Goal 1   Goal Identifier reaching   Goal Description ability to use R arm overhead adn lifting up to full ROM   Rationale to maximize safety and independence with performance of ADLs and functional tasks;to maximize safety and independence within the home;to maximize safety and independence with self cares   Goal Progress AROM 160-170   Target Date 11/21/24   Date Met 12/31/24   Subjective Report   Subjective Report Visit #9. \"I am Hurting\" from work today. Otherwise, patient notes of gradually improving pain, function, strength and mobility. Roughly 7 months post op. Recommend to stay on with protecting R shoulder from over stress of heavy lifting and repeated work overhead yet. He is roughly 80% recovered function. Follow up with MD next week. Ol[facility] PT follow up 1x/week to every other week for 8 weeks , purely based on MD consult.   Objective Measures   Objective Measures Objective Measure 1;Objective Measure 2;Objective Measure 3   Objective Measure 1   Objective Measure SPADI 5/100   Objective Measure 2   Objective Measure AROM   Details Flexion 160  vs 175, IR 90, ER 50  , 110  Abduction 90   Objective Measure 3   Objective Measure Scap Control   PT Modalities   PT Modalities Cryotherapy   Cryotherapy   Treatment Detail seated CP to R shoulder   Patient Response/Progress reduces post exercise soreness "   Treatment Interventions (PT)   Interventions Therapeutic Procedure/Exercise;Neuromuscular Re-education   Therapeutic Procedure/Exercise   Therapeutic Procedures: strength, endurance, ROM, flexibility minutes (96980) 35   Therapeutic Procedures Ther Proc 2;Ther Proc 3;Ther Proc 4;Ther Proc 5;Ther Proc 6   Ther Proc 1 pendulum x 20 each   Ther Proc 1 - Details 4 corner ROM x 3, 30 sec hold   Skilled Intervention manual and verbal directive   Patient Response/Progress minimal soreness   Neuromuscular Re-education   Neuromuscular Re-education Neuro Re-ed 2   Education   Learner/Method Patient;Demonstration;Pictures/Video;No Barriers to Learning   Plan   Home program see ptrx   Plan for next session Advance strength based on final stage of rehab program   Total Session Time   Timed Code Treatment Minutes 35   Total Treatment Time (sum of timed and untimed services) 35         Beginning/End Dates of Progress Note Reporting Period:  12/17/24 to 12/31/2024    Referring Provider:  Osman Stovall

## 2025-01-06 ENCOUNTER — TRANSCRIBE ORDERS (OUTPATIENT)
Dept: OTHER | Age: 55
End: 2025-01-06

## 2025-01-06 DIAGNOSIS — Z47.89 ORTHOPEDIC AFTERCARE: Primary | ICD-10-CM

## 2025-01-31 ENCOUNTER — THERAPY VISIT (OUTPATIENT)
Dept: PHYSICAL THERAPY | Facility: CLINIC | Age: 55
End: 2025-01-31
Payer: OTHER MISCELLANEOUS

## 2025-01-31 DIAGNOSIS — M25.511 ACUTE PAIN OF RIGHT SHOULDER: ICD-10-CM

## 2025-01-31 DIAGNOSIS — Z98.890 S/P SHOULDER SURGERY: Primary | ICD-10-CM

## 2025-01-31 PROCEDURE — 97110 THERAPEUTIC EXERCISES: CPT | Mod: GP | Performed by: PHYSICAL THERAPIST

## 2025-02-08 NOTE — PROGRESS NOTES
PLAN  Continue therapy per current plan of care.     01/31/25 0500   Appointment Info   Signing clinician's name / credentials Tank Carl PT ATC Cert MDt   Total/Authorized Visits new referral x 8   Visits Used 4/8   Medical Diagnosis s/p Massive RCR   PT Tx Diagnosis s/p Massive RCR   Other pertinent information Follow [facility] post op Massive RCR Program   Progress Note/Certification   Onset of illness/injury or Date of Surgery 05/30/24   Therapy Frequency 1x/week   Predicted Duration 16 weeks   Progress Note Due Date 03/15/25   Progress Note Completed Date 01/31/25       Present No   PT Goal 1   Goal Identifier reaching   Goal Description ability to use R arm overhead adn lifting up to full ROM   Rationale to maximize safety and independence with performance of ADLs and functional tasks;to maximize safety and independence within the home;to maximize safety and independence with self cares   Goal Progress AROM 160-170   Target Date 11/21/24   Date Met 12/31/24   Subjective Report   Subjective Report Visit #10. Patient returns to therapy, he has been self managing for he past month. He has RTW FT. with restrictions. He returns today noting that he injured his opposite shoulder while on the job.. He is 8-9 months post op Massive RCR R shoulder. Primary rehab is now outside the [facility] while Mahad self manages and returning back to his day to day life. R joce seems to be responding well. He is advised to R shoulder strength 3x/week yet with light weight and high reps. He has good understanding and self reports that he is faithful to directed exercises at home. L shoulder demonstrates a painful arc and antalgia while he raises.   Objective Measures   Objective Measures Objective Measure 1;Objective Measure 2;Objective Measure 3   Objective Measure 1   Objective Measure SPADI 5/100   Objective Measure 2   Objective Measure AROM   Details Flexion 160  vs 175, IR 90, ER 50  , 110  Abduction 90    Objective Measure 3   Objective Measure Scap Control   Details decent, slight hike yet.   PT Modalities   PT Modalities Cryotherapy   Cryotherapy   Treatment Detail seated CP to R shoulder   Patient Response/Progress reduces post exercise soreness   Treatment Interventions (PT)   Interventions Therapeutic Procedure/Exercise;Neuromuscular Re-education   Therapeutic Procedure/Exercise   Therapeutic Procedures: strength, endurance, ROM, flexibility minutes (24437) 37   Therapeutic Procedures Ther Proc 2;Ther Proc 3;Ther Proc 4;Ther Proc 5;Ther Proc 6   Ther Proc 1 pendulum x 20 each   Ther Proc 1 - Details 4 corner ROM x 3, 30 sec hold   Ther Proc 2 supien Ceiling punch   Ther Proc 2 - Details supine reverse pendulum   Ther Proc 3 bent over row   Ther Proc 3 - Details sidelying ER   Ther Proc 4 WIng   Skilled Intervention manual and verbal directive   Patient Response/Progress minimal soreness   Neuromuscular Re-education   Neuromuscular Re-education Neuro Re-ed 2   Education   Learner/Method Patient;Demonstration;Pictures/Video;No Barriers to Learning   Plan   Home program see ptrx   Plan for next session Advance strength based on final stage of rehab program   Total Session Time   Timed Code Treatment Minutes 37   Total Treatment Time (sum of timed and untimed services) 37           Referring Provider:  Osman Stovall

## 2025-02-13 ENCOUNTER — THERAPY VISIT (OUTPATIENT)
Dept: PHYSICAL THERAPY | Facility: CLINIC | Age: 55
End: 2025-02-13
Payer: OTHER MISCELLANEOUS

## 2025-02-13 DIAGNOSIS — Z98.890 S/P SHOULDER SURGERY: Primary | ICD-10-CM

## 2025-02-13 DIAGNOSIS — M25.511 ACUTE PAIN OF RIGHT SHOULDER: ICD-10-CM

## 2025-02-13 PROCEDURE — 97110 THERAPEUTIC EXERCISES: CPT | Mod: GP | Performed by: PHYSICAL THERAPIST

## 2025-03-18 ENCOUNTER — THERAPY VISIT (OUTPATIENT)
Dept: PHYSICAL THERAPY | Facility: CLINIC | Age: 55
End: 2025-03-18
Payer: OTHER MISCELLANEOUS

## 2025-03-18 DIAGNOSIS — M25.511 ACUTE PAIN OF RIGHT SHOULDER: ICD-10-CM

## 2025-03-18 DIAGNOSIS — Z98.890 S/P SHOULDER SURGERY: Primary | ICD-10-CM

## 2025-03-18 PROCEDURE — 97110 THERAPEUTIC EXERCISES: CPT | Mod: GP | Performed by: PHYSICAL THERAPIST

## 2025-04-29 ENCOUNTER — THERAPY VISIT (OUTPATIENT)
Dept: PHYSICAL THERAPY | Facility: CLINIC | Age: 55
End: 2025-04-29
Payer: OTHER MISCELLANEOUS

## 2025-04-29 DIAGNOSIS — Z98.890 S/P SHOULDER SURGERY: Primary | ICD-10-CM

## 2025-04-29 DIAGNOSIS — M25.511 ACUTE PAIN OF RIGHT SHOULDER: ICD-10-CM

## 2025-04-29 PROCEDURE — 97110 THERAPEUTIC EXERCISES: CPT | Mod: GP | Performed by: PHYSICAL THERAPIST

## 2025-05-01 NOTE — PROGRESS NOTES
DISCHARGE  Reason for Discharge: Patient has met all goals.  No further expectation of progress.        Discharge Plan: Patient to continue home program.       04/29/25 0500   Appointment Info   Signing clinician's name / credentials Tank Carl PT ATC Cert MDt   Total/Authorized Visits new referral x 8   Visits Used 8/8   Medical Diagnosis s/p Massive RCR   PT Tx Diagnosis s/p Massive RCR   Other pertinent information Follow [facility] post op Massive RCR Program   Progress Note/Certification   Onset of illness/injury or Date of Surgery 05/30/24   Therapy Frequency 1x/week   Predicted Duration 16 weeks   Progress Note Due Date 03/15/25       Present No   GOALS   PT Goals 2   PT Goal 1   Goal Identifier reaching   Goal Description ability to use R arm overhead adn lifting up to full ROM   Rationale to maximize safety and independence with performance of ADLs and functional tasks;to maximize safety and independence within the home;to maximize safety and independence with self cares   Goal Progress AROM 160-170   Target Date 11/21/24   Date Met 12/31/24   PT Goal 2   Goal Identifier lifting   Goal Description STG: floor to waist 20-25#, Waist to Shoulder 10-20#   Rationale to maximize safety and independence with performance of ADLs and functional tasks;to maximize safety and independence within the home;to maximize safety and independence with self cares  (work demands)   Goal Progress Floor to waist 40-50#, waist to shoulder 30#. Not tested overhead   Target Date 03/28/25   Date Met 04/29/25   Subjective Report   Subjective Report 9-10 months post op R RCR. He is working with restrictions. He has settled with insurance company. 3 visits PT remain, MD appointment in May   Objective Measures   Objective Measures Objective Measure 1;Objective Measure 2;Objective Measure 3   Objective Measure 1   Objective Measure SPADI 2/100   Objective Measure 2   Objective Measure AROM   Details Flexion 160  vs  175, IR 90, ER 55  , 110  Abduction 90   Objective Measure 3   Objective Measure Scap Control   Details decent, slight hike yet.   PT Modalities   PT Modalities Cryotherapy   Cryotherapy   Treatment Detail seated CP to R shoulder   Patient Response/Progress reduces post exercise soreness   Treatment Interventions (PT)   Interventions Therapeutic Procedure/Exercise;Neuromuscular Re-education   Therapeutic Procedure/Exercise   Therapeutic Procedures: strength, endurance, ROM, flexibility minutes (46038) 35   Therapeutic Procedures Ther Proc 2;Ther Proc 3;Ther Proc 4;Ther Proc 5;Ther Proc 6   Ther Proc 2 Standing Flexion x 15, 2#   Ther Proc 2 - Details Standing Scaption x 15, 12#   Ther Proc 3 Standing IR with 2 plates x 15   Ther Proc 4 - Details Manual ROM flexion, ER, IR , Abduction x 10   Ther Proc 5 re-exam and re-assess strength   Skilled Intervention manual and verbal directive   Patient Response/Progress minimal soreness   Neuromuscular Re-education   Neuromuscular Re-education Neuro Re-ed 2   Education   Learner/Method Patient;Demonstration;Pictures/Video;No Barriers to Learning   Plan   Home program see ptrx   Plan for next session Advance strength based on final stage of rehab program   Comments   Comments DC PT unless directed otherwise   Total Session Time   Timed Code Treatment Minutes 35   Total Treatment Time (sum of timed and untimed services) 35         Referring Provider:  Osman Stovall

## 2025-05-27 ENCOUNTER — THERAPY VISIT (OUTPATIENT)
Dept: PHYSICAL THERAPY | Facility: CLINIC | Age: 55
End: 2025-05-27

## 2025-05-27 DIAGNOSIS — M25.511 ACUTE PAIN OF RIGHT SHOULDER: ICD-10-CM

## 2025-05-27 DIAGNOSIS — Z98.890 S/P SHOULDER SURGERY: Primary | ICD-10-CM

## 2025-05-27 PROCEDURE — 97110 THERAPEUTIC EXERCISES: CPT | Mod: GP | Performed by: PHYSICAL THERAPIST

## 2025-05-27 PROCEDURE — 97140 MANUAL THERAPY 1/> REGIONS: CPT | Mod: GP | Performed by: PHYSICAL THERAPIST

## 2025-05-27 NOTE — PROGRESS NOTES
PLAN     05/27/25 0500   Appointment Info   Signing clinician's name / credentials Tank Carl PT ATC Cert MDt   Total/Authorized Visits new referral x 8   Visits Used 8/8   Medical Diagnosis s/p Massive RCR   PT Tx Diagnosis s/p Massive RCR   Other pertinent information Follow [facility] post op Massive RCR Program   Progress Note/Certification   Onset of illness/injury or Date of Surgery 05/30/24   Therapy Frequency 1x/week   Predicted Duration 16 weeks   Progress Note Due Date 03/15/25       Present No   GOALS   PT Goals 2   PT Goal 1   Goal Identifier reaching   Goal Description ability to use R arm overhead adn lifting up to full ROM   Rationale to maximize safety and independence with performance of ADLs and functional tasks;to maximize safety and independence within the home;to maximize safety and independence with self cares   Goal Progress AROM 160-170   Target Date 11/21/24   Date Met 12/31/24   PT Goal 2   Goal Identifier lifting   Goal Description STG: floor to waist 20-25#, Waist to Shoulder 10-20#   Rationale to maximize safety and independence with performance of ADLs and functional tasks;to maximize safety and independence within the home;to maximize safety and independence with self cares  (work demands)   Goal Progress Floor to waist 40-50#, waist to shoulder 30#. Not tested overhead   Target Date 03/28/25   Date Met 04/29/25   Subjective Report   Subjective Report 1 year post op R shoulder Massive RCR. Patient also has just been Discharged by Dr. Stovall with progress note completed in his chart for follow up as needed to his office. Mahad does have soem low signs of minor arthritis R shoulder as well as limited ER and scap dyskinesis. We addressed those limitations today with HEP of Gentle self directed ER stretch and self directed inferior glide mobilization. Functionally Mahad is 95-98% recovered. He is limited from heavy lifting, prolong use of his R arm and overhead work. Follow  up on a prn basis. Patient noted that he wants to follow up with Delonte in 3-4 months and Cezar Stovall in 6-9 months to assess arthritic changes.   Objective Measures   Objective Measures Objective Measure 1;Objective Measure 2;Objective Measure 3   Objective Measure 1   Objective Measure SPADI 2/100   Objective Measure 2   Objective Measure AROM   Details Flexion 160  vs 175, IR 90, ER 55  , 110  Abduction 90   Objective Measure 3   Objective Measure Scap Control   Details decent, slight hike yet.   PT Modalities   PT Modalities Cryotherapy   Cryotherapy   Treatment Detail seated CP to R shoulder   Patient Response/Progress reduces post exercise soreness   Treatment Interventions (PT)   Interventions Therapeutic Procedure/Exercise;Neuromuscular Re-education;Manual Therapy   Therapeutic Procedure/Exercise   Therapeutic Procedures: strength, endurance, ROM, flexibility minutes (26356) 25   Therapeutic Procedures Ther Proc 2;Ther Proc 3;Ther Proc 4;Ther Proc 5;Ther Proc 6   Ther Proc 2 Standing Flexion x 15, 2#   Ther Proc 2 - Details Standing Scaption x 15, 12#   Ther Proc 3 Standing IR with 2 plates x 15   Ther Proc 4 - Details Manual ROM flexion, ER, IR , Abduction x 10   Ther Proc 5 re-exam and re-assess strength   Skilled Intervention manual and verbal directive   Patient Response/Progress minimal soreness   Neuromuscular Re-education   Neuromuscular Re-education Neuro Re-ed 2   Manual Therapy   Manual Therapy: Mobilization, MFR, MLD, friction massage minutes ([id number]) 13   Manual Therapy 1 Manual inferior glide grade 2-3   Manual Therapy 1 - Details Manual posterior glide, grade 2-3   Patient Response/Progress sore at end range   Education   Learner/Method Patient;Demonstration;Pictures/Video;No Barriers to Learning   Plan   Home program see ptrx   Plan for next session Advance strength based on final stage of rehab program   Comments   Comments consider ANterior glide if limited ER yet.   Total Session Time   Timed  Code Treatment Minutes 38   Total Treatment Time (sum of timed and untimed services) 38         Beginning/End Dates of Progress Note Reporting Period:   4/29/2025 to 05/27/2025    Referring Provider:  Osman Stovall